# Patient Record
Sex: MALE | Race: WHITE | NOT HISPANIC OR LATINO | Employment: FULL TIME | URBAN - METROPOLITAN AREA
[De-identification: names, ages, dates, MRNs, and addresses within clinical notes are randomized per-mention and may not be internally consistent; named-entity substitution may affect disease eponyms.]

---

## 2024-05-29 ENCOUNTER — HOSPITAL ENCOUNTER (EMERGENCY)
Facility: HOSPITAL | Age: 49
Discharge: HOME | End: 2024-05-29
Attending: EMERGENCY MEDICINE
Payer: COMMERCIAL

## 2024-05-29 VITALS
HEART RATE: 68 BPM | OXYGEN SATURATION: 98 % | SYSTOLIC BLOOD PRESSURE: 124 MMHG | HEIGHT: 70 IN | DIASTOLIC BLOOD PRESSURE: 78 MMHG | BODY MASS INDEX: 30.06 KG/M2 | WEIGHT: 210 LBS | RESPIRATION RATE: 18 BRPM | TEMPERATURE: 97.7 F

## 2024-05-29 DIAGNOSIS — R00.2 INTERMITTENT PALPITATIONS: Primary | ICD-10-CM

## 2024-05-29 DIAGNOSIS — I10 PRIMARY HYPERTENSION: ICD-10-CM

## 2024-05-29 DIAGNOSIS — R07.89 ATYPICAL CHEST PAIN: ICD-10-CM

## 2024-05-29 DIAGNOSIS — R42 DIZZINESS: ICD-10-CM

## 2024-05-29 LAB
ANION GAP SERPL CALCULATED.3IONS-SCNC: 5 MMOL/L (ref 4–13)
ATRIAL RATE: 69 BPM
BASOPHILS # BLD AUTO: 0.08 THOUSANDS/ÂΜL (ref 0–0.1)
BASOPHILS NFR BLD AUTO: 1 % (ref 0–1)
BUN SERPL-MCNC: 16 MG/DL (ref 5–25)
CALCIUM SERPL-MCNC: 9.1 MG/DL (ref 8.4–10.2)
CARDIAC TROPONIN I PNL SERPL HS: <2 NG/L
CARDIAC TROPONIN I PNL SERPL HS: <2 NG/L
CHLORIDE SERPL-SCNC: 104 MMOL/L (ref 96–108)
CO2 SERPL-SCNC: 28 MMOL/L (ref 21–32)
CREAT SERPL-MCNC: 1.25 MG/DL (ref 0.6–1.3)
EOSINOPHIL # BLD AUTO: 0.31 THOUSAND/ÂΜL (ref 0–0.61)
EOSINOPHIL NFR BLD AUTO: 4 % (ref 0–6)
ERYTHROCYTE [DISTWIDTH] IN BLOOD BY AUTOMATED COUNT: 13.6 % (ref 11.6–15.1)
GFR SERPL CREATININE-BSD FRML MDRD: 67 ML/MIN/1.73SQ M
GLUCOSE SERPL-MCNC: 92 MG/DL (ref 65–140)
HCT VFR BLD AUTO: 45.6 % (ref 36.5–49.3)
HGB BLD-MCNC: 15.2 G/DL (ref 12–17)
IMM GRANULOCYTES # BLD AUTO: 0.03 THOUSAND/UL (ref 0–0.2)
IMM GRANULOCYTES NFR BLD AUTO: 0 % (ref 0–2)
LYMPHOCYTES # BLD AUTO: 2.22 THOUSANDS/ÂΜL (ref 0.6–4.47)
LYMPHOCYTES NFR BLD AUTO: 27 % (ref 14–44)
MCH RBC QN AUTO: 28.5 PG (ref 26.8–34.3)
MCHC RBC AUTO-ENTMCNC: 33.3 G/DL (ref 31.4–37.4)
MCV RBC AUTO: 85 FL (ref 82–98)
MONOCYTES # BLD AUTO: 0.96 THOUSAND/ÂΜL (ref 0.17–1.22)
MONOCYTES NFR BLD AUTO: 12 % (ref 4–12)
NEUTROPHILS # BLD AUTO: 4.66 THOUSANDS/ÂΜL (ref 1.85–7.62)
NEUTS SEG NFR BLD AUTO: 56 % (ref 43–75)
NRBC BLD AUTO-RTO: 0 /100 WBCS
P AXIS: 28 DEGREES
PLATELET # BLD AUTO: 256 THOUSANDS/UL (ref 149–390)
PMV BLD AUTO: 9.6 FL (ref 8.9–12.7)
POTASSIUM SERPL-SCNC: 4.1 MMOL/L (ref 3.5–5.3)
PR INTERVAL: 148 MS
QRS AXIS: 14 DEGREES
QRSD INTERVAL: 94 MS
QT INTERVAL: 380 MS
QTC INTERVAL: 407 MS
RBC # BLD AUTO: 5.34 MILLION/UL (ref 3.88–5.62)
SODIUM SERPL-SCNC: 137 MMOL/L (ref 135–147)
T WAVE AXIS: 24 DEGREES
TSH SERPL DL<=0.05 MIU/L-ACNC: 1.78 UIU/ML (ref 0.45–4.5)
VENTRICULAR RATE: 69 BPM
WBC # BLD AUTO: 8.26 THOUSAND/UL (ref 4.31–10.16)

## 2024-05-29 PROCEDURE — 84443 ASSAY THYROID STIM HORMONE: CPT | Performed by: EMERGENCY MEDICINE

## 2024-05-29 PROCEDURE — 99285 EMERGENCY DEPT VISIT HI MDM: CPT | Performed by: EMERGENCY MEDICINE

## 2024-05-29 PROCEDURE — 93010 ELECTROCARDIOGRAM REPORT: CPT | Performed by: INTERNAL MEDICINE

## 2024-05-29 PROCEDURE — 36415 COLL VENOUS BLD VENIPUNCTURE: CPT | Performed by: EMERGENCY MEDICINE

## 2024-05-29 PROCEDURE — 99284 EMERGENCY DEPT VISIT MOD MDM: CPT

## 2024-05-29 PROCEDURE — 93005 ELECTROCARDIOGRAM TRACING: CPT

## 2024-05-29 PROCEDURE — 84484 ASSAY OF TROPONIN QUANT: CPT | Performed by: EMERGENCY MEDICINE

## 2024-05-29 PROCEDURE — 85025 COMPLETE CBC W/AUTO DIFF WBC: CPT | Performed by: EMERGENCY MEDICINE

## 2024-05-29 PROCEDURE — 80048 BASIC METABOLIC PNL TOTAL CA: CPT | Performed by: EMERGENCY MEDICINE

## 2024-05-29 NOTE — DISCHARGE INSTRUCTIONS
Today's exam and labs are very reassuring.  I do not find any signs of acute cardiac, pulmonary or brain problem.    Continue present medications.  Keep well-hydrated.  Attempt to eat a normal diet and get 7 to 8 hours of sleep nightly.    Follow-up with PCP as needed.    Return if problem arises.

## 2024-05-29 NOTE — Clinical Note
Zeeshan Mccurdy was seen and treated in our emergency department on 5/29/2024.                Diagnosis:     Zeeshan  may return to work on return date.    He may return on this date: 05/30/2024         If you have any questions or concerns, please don't hesitate to call.      Damon Hannon, DO    ______________________________           _______________          _______________  Hospital Representative                              Date                                Time

## 2024-05-29 NOTE — ED PROVIDER NOTES
History  Chief Complaint   Patient presents with    Palpitations     Pt reports palpitations, tingling, headache, shortness of breath and chest pain that started at work this am at 0930. Had a nurse where he works check his blood pressure and told it was high.      This 49-year-old male with history of hypertension, hyperlipidemia and depression has been feeling well recently.  He was doing his normal work which stopped involve much exertion when he suddenly had a feeling of lightheadedness, change in vision, palpitations and chest pain.  Started around 930 today.  Initial change in vision is described as every time his heart beat the vision slightly enlarged.  Denies diplopia, darkening of scotoma.  He had some nausea and lightheadedness with this with some mild chest tightness.  He went to the nurse who said his blood pressure was extremely high and he needs to be evaluated.  Patient states that he stopped smoking approximately 10 years ago better.  Says that he is compliant with his antihypertensive medicine but is slowly tapering off his fluoxetine.  He states blood pressures typically run around 160/90.  Denies recent fever, vomiting, diarrhea, bloody stool, rash, foreign travel.        None       Past Medical History:   Diagnosis Date    High cholesterol     Hypertension        History reviewed. No pertinent surgical history.    History reviewed. No pertinent family history.  I have reviewed and agree with the history as documented.    E-Cigarette/Vaping     E-Cigarette/Vaping Substances     Social History     Tobacco Use    Smoking status: Never    Smokeless tobacco: Never   Substance Use Topics    Alcohol use: Never    Drug use: Never       Review of Systems   Constitutional:  Negative for fever.   Respiratory:  Negative for cough and shortness of breath.    Cardiovascular:  Negative for leg swelling.   Gastrointestinal:  Negative for abdominal pain, blood in stool and diarrhea.   Neurological:  Negative for  syncope.       Physical Exam  Physical Exam  Vitals and nursing note reviewed.   Constitutional:       General: He is not in acute distress.     Appearance: He is well-developed and normal weight. He is not ill-appearing or diaphoretic.   HENT:      Head: Normocephalic and atraumatic.      Right Ear: External ear normal.      Left Ear: External ear normal.      Mouth/Throat:      Mouth: Mucous membranes are moist.      Pharynx: Oropharynx is clear.   Eyes:      General: No scleral icterus.     Extraocular Movements: Extraocular movements intact.      Conjunctiva/sclera: Conjunctivae normal.      Pupils: Pupils are equal, round, and reactive to light.   Neck:      Vascular: No carotid bruit or JVD.   Cardiovascular:      Rate and Rhythm: Normal rate and regular rhythm.      Pulses: Normal pulses.      Heart sounds: Normal heart sounds.   Pulmonary:      Effort: Pulmonary effort is normal. No respiratory distress.      Breath sounds: Normal breath sounds.   Abdominal:      Palpations: Abdomen is soft. There is no mass.      Tenderness: There is no abdominal tenderness.   Musculoskeletal:         General: No tenderness. Normal range of motion.      Cervical back: Neck supple.      Right lower leg: No edema.      Left lower leg: No edema.   Skin:     General: Skin is warm and dry.      Capillary Refill: Capillary refill takes less than 2 seconds.      Findings: No rash.   Neurological:      General: No focal deficit present.      Mental Status: He is alert and oriented to person, place, and time. Mental status is at baseline.      Cranial Nerves: No cranial nerve deficit.      Sensory: No sensory deficit.      Motor: No weakness.      Coordination: Coordination normal.      Gait: Gait normal.      Deep Tendon Reflexes: Reflexes are normal and symmetric.   Psychiatric:         Mood and Affect: Mood normal.         Behavior: Behavior normal.         Vital Signs  ED Triage Vitals [05/29/24 1132]   Temperature Pulse  Respirations Blood Pressure SpO2   97.7 °F (36.5 °C) 73 18 165/99 98 %      Temp Source Heart Rate Source Patient Position - Orthostatic VS BP Location FiO2 (%)   Oral Monitor Sitting Right arm --      Pain Score       --           Vitals:    05/29/24 1132   BP: 165/99   Pulse: 73   Patient Position - Orthostatic VS: Sitting         Visual Acuity      ED Medications  Medications - No data to display    Diagnostic Studies  Results Reviewed       Procedure Component Value Units Date/Time    CBC and differential [[id number]] Collected: 05/29/24 1157    Lab Status: No result Specimen: Blood from Arm, Left     Basic metabolic panel [[id number]] Collected: 05/29/24 1157    Lab Status: No result Specimen: Blood from Arm, Left     TSH, 3rd generation with Free T4 reflex [[id number]] Collected: 05/29/24 1157    Lab Status: No result Specimen: Blood from Arm, Left     HS Troponin 0hr (reflex protocol) [[id number]] Collected: 05/29/24 1157    Lab Status: No result Specimen: Blood from Arm, Left                    No orders to display              Procedures  ECG 12 Lead Documentation Only    Date/Time: 5/29/2024 11:39 AM    Performed by: Damon Hannon DO  Authorized by: Damon Hannon DO    ECG reviewed by me, the ED Provider: yes    Patient location:  ED  Previous ECG:     Previous ECG:  Unavailable    Comparison to cardiac monitor: Yes    Interpretation:     Interpretation: normal             ED Course                               SBIRT 20yo+      Flowsheet Row Most Recent Value   Initial Alcohol Screen: US AUDIT-C     1. How often do you have a drink containing alcohol? 0 Filed at: 05/29/2024 1133   2. How many drinks containing alcohol do you have on a typical day you are drinking?  0 Filed at: 05/29/2024 1133   3a. Male UNDER 65: How often do you have five or more drinks on one occasion? 0 Filed at: 05/29/2024 1133   3b. FEMALE Any Age, or MALE 65+: How often do you have 4 or more drinks on one occassion? 0 Filed at:  05/29/2024 1133   Audit-C Score 0 Filed at: 05/29/2024 1133   KENNETH: How many times in the past year have you...    Used an illegal drug or used a prescription medication for non-medical reasons? Never Filed at: 05/29/2024 1133                      Medical Decision Making  49-year-old male with obesity, hypertension had sudden onset of change in vision, palpitations, mild chest pressure.  Blood pressure was elevated.  Exam is unremarkable currently.  EKG is unremarkable.  Vital signs are stable.  Will check labs and rule out ACS, electrolyte abnormality, thyroid issues, anemia, dysrhythmia.             Disposition  Final diagnoses:   None     ED Disposition       None          Follow-up Information    None         Patient's Medications    No medications on file       No discharge procedures on file.    PDMP Review       None            ED Provider  Electronically Signed by           [id number]   Risk Factors 1 Filed at: 05/29/2024 1404   Troponin 0 Filed at: 05/29/2024 1404   HEART Score 2 Filed at: 05/29/2024 1404                          SBIRT 22yo+      Flowsheet Row Most Recent Value   Initial Alcohol Screen: US AUDIT-C     1. How often do you have a drink containing alcohol? 0 Filed at: 05/29/2024 1133   2. How many drinks containing alcohol do you have on a typical day you are drinking?  0 Filed at: 05/29/2024 1133   3a. Male UNDER 65: How often do you have five or more drinks on one occasion? 0 Filed at: 05/29/2024 1133   3b. FEMALE Any Age, or MALE 65+: How often do you have 4 or more drinks on one occassion? 0 Filed at: 05/29/2024 1133   Audit-C Score 0 Filed at: 05/29/2024 1133   KENNETH: How many times in the past year have you...    Used an illegal drug or used a prescription medication for non-medical reasons? Never Filed at: 05/29/2024 1133                      Medical Decision Making  49-year-old male with obesity, hypertension had sudden onset of change in vision, palpitations, mild chest pressure.  Blood pressure was elevated.  Exam is unremarkable currently.  EKG is unremarkable.  Vital signs are stable.  Will check labs and rule out ACS, electrolyte abnormality, thyroid issues, anemia, dysrhythmia.    Amount and/or Complexity of Data Reviewed  Labs: ordered.             Disposition  Final diagnoses:   Intermittent palpitations   Primary hypertension   Atypical chest pain   Dizziness     Time reflects when diagnosis was documented in both MDM as applicable and the Disposition within this note       Time User Action Codes Description Comment    5/29/2024  2:20 PM Damon Hannon Add [R00.2] Palpitations     5/29/2024  2:20 PM Damon Hannon Add [R00.2] Intermittent palpitations     5/29/2024  2:20 PM Damon Hannon Modify [R00.2] Intermittent palpitations     5/29/2024  2:20 PM Damon Hannon Remove [R00.2] Palpitations     5/29/2024  2:20 PM Damon Hannon Add [I10]  Primary hypertension     5/29/2024  2:20 PM Damon Hannon Add [R07.89] Atypical chest pain     5/29/2024  2:20 PM Damon Hannon [R42] Dizziness           ED Disposition       ED Disposition   Discharge    Condition   Stable    Date/Time   Wed May 29, 2024 1420    Comment   Zeeshan Calli discharge to home/self care.                   Follow-up Information       Follow up With Specialties Details Why Contact Info    Mili Rider MD Family Medicine Schedule an appointment as soon as possible for a visit  As needed [address] [place] [address]  [phone]              There are no discharge medications for this patient.      No discharge procedures on file.    PDMP Review       None            ED Provider  Electronically Signed by             Damon Hannon DO  06/03/24 [id number]